# Patient Record
(demographics unavailable — no encounter records)

---

## 2025-01-19 NOTE — ASSESSMENT
[FreeTextEntry1] : Plan:  Order LAR Will need insurance preapproval Use Mail order ([facility]) if available  Contact Klaus Escamilla Daughter to call in 1 month for update

## 2025-01-19 NOTE — HISTORY OF PRESENT ILLNESS
[FreeTextEntry1] : Recently in hospital for angioectasias Accompanied by daughter  AMADO Ontiveros  Hospitalized twice yearly for blood transfusions since 2020. On oral iron supplementation Never IV iron. Plan is for LAR octreotide. Prior visit to Dr. Klaus Escamilla (hematology)   Octreotide 3 month supply Currently 100 mcg SQ BID  [facility]

## 2025-01-19 NOTE — ASSESSMENT
[FreeTextEntry1] : Plan:  Order LAR Will need insurance preapproval Use Mail order ([facility]) if available  Contact Klaus Escamilla Daughter to call in 1 month for update

## 2025-01-19 NOTE — CONSULT LETTER
[Dear  ___] : Dear  [unfilled], [Consult Letter:] : I had the pleasure of evaluating your patient, [unfilled]. [Please see my note below.] : Please see my note below. [Consult Closing:] : Thank you very much for allowing me to participate in the care of this patient.  If you have any questions, please do not hesitate to contact me. [Sincerely,] : Sincerely, [FreeTextEntry3] : Mehrdad Escamilla MD, MPH, CHRISTIE, UMAIRG Chief of Clinical Quality in Gastroenterology, [facility] Associate Chief of Gastroenterology, [facility] Interim Director of Endoscopy, [address], [address] [facility], [id number] 24 hours [phone]  [DrCarli  ___] : Dr. PINEDA

## 2025-01-19 NOTE — CONSULT LETTER
[Dear  ___] : Dear  [unfilled], [Consult Letter:] : I had the pleasure of evaluating your patient, [unfilled]. [Please see my note below.] : Please see my note below. [Consult Closing:] : Thank you very much for allowing me to participate in the care of this patient.  If you have any questions, please do not hesitate to contact me. [Sincerely,] : Sincerely, [FreeTextEntry3] : Mehrdad Escamilla MD, MPH, CHRISTIE, UMAIRG Chief of Clinical Quality in Gastroenterology, [facility] Associate Chief of Gastroenterology, [facility] Interim Director of Endoscopy, [address], [address] [facility], [address] 24 hours [phone]  [DrCarli  ___] : Dr. PINEDA

## 2025-01-19 NOTE — HISTORY OF PRESENT ILLNESS
[FreeTextEntry1] : Recently in hospital for angioectasias Accompanied by daughter  AMADO Ontiveros  Hospitalized twice yearly for blood transfusions since 2020. On oral iron supplementation Never IV iron. Plan is for LAR octreotide. Prior visit to Dr. Klaus Escamilla (hematology)   Octreotide 3 month supply Currently 100 mcg SQ BID  [facility]

## 2025-01-19 NOTE — PHYSICAL EXAM
[Alert] : alert [Sclera] : the sclera and conjunctiva were normal [Normal Affect] : the affect was normal [de-identified] : Soft, nondistended, nontender [de-identified] : No confusion

## 2025-01-19 NOTE — PHYSICAL EXAM
[Alert] : alert [Sclera] : the sclera and conjunctiva were normal [Normal Affect] : the affect was normal [de-identified] : Soft, nondistended, nontender [de-identified] : No confusion

## 2025-06-02 NOTE — CONSULT LETTER
[Dear  ___] : Dear  [unfilled], [Consult Letter:] : I had the pleasure of evaluating your patient, [unfilled]. [Please see my note below.] : Please see my note below. [Consult Closing:] : Thank you very much for allowing me to participate in the care of this patient.  If you have any questions, please do not hesitate to contact me. [Sincerely,] : Sincerely, [FreeTextEntry3] : Mehrdad Escamilla MD, MPH, CHRISTIE, UMAIRG Chief of Clinical Quality in Gastroenterology, [facility] Associate Chief of Gastroenterology, [facility] Interim Director of Endoscopy, [address], [address] [facility], [id number] 24 hours [phone]

## 2025-06-02 NOTE — PHYSICAL EXAM
[Alert] : alert [Sclera] : the sclera and conjunctiva were normal [Normal Affect] : the affect was normal [de-identified] : Soft, nondistended, nontender [de-identified] : Wheelchair [de-identified] : No confusion

## 2025-06-02 NOTE — CONSULT LETTER
[Dear  ___] : Dear  [unfilled], [Consult Letter:] : I had the pleasure of evaluating your patient, [unfilled]. [Please see my note below.] : Please see my note below. [Consult Closing:] : Thank you very much for allowing me to participate in the care of this patient.  If you have any questions, please do not hesitate to contact me. [Sincerely,] : Sincerely, [FreeTextEntry3] : Mehrdad Escamilla MD, MPH, CHRISTIE, UMAIRG Chief of Clinical Quality in Gastroenterology, [facility] Associate Chief of Gastroenterology, [facility] Interim Director of Endoscopy, [address], [address] [facility], [address] 24 hours [phone]

## 2025-06-02 NOTE — ASSESSMENT
[FreeTextEntry1] : Impression:  #1. GI bleed with melena (and small hematemesis on late Dec 2023 admission). Chronic asymptomatic normocytic anemia, with dark solid stools, and baseline Hb 9. Report of iron deficiency, on iron supplementation. Workup of anemia included EGD/colonoscopy in Feb 2019, with finding of colonic angioectasis which were treated with argon plasma coagulation. Hospitalized for GI bleed in early May 2020, had colonoscopy with treatment of colonic AVMs. Patient rehospitalized in early March 2023 for severe blood loss anemia requiring blood transfusions. Blood loss anemia improved with michael hemoglobin of 5, discharge 8-9, follow-up as outpatient 10. Outpatient video capsule endoscopy in spring 2023 demonstrated enteritis. Patient was planned for discussion of anterograde single balloon enteroscopy. Patient had acute coronary syndrome in fall 2023, started on dual antiplatelet agents for medical management for known unstentable disease, and hospitalized 3 times in December 2023 for recurrent GI bleed, CT angiogram demonstrated cecal bleeding, colonoscopy was performed with treatment for cecal AVMs with argon plasma coagulation. Upper endoscopy was performed in January 2024 for hematemesis, which demonstrated ulcerated mucosa in the stomach. Stable/improved hemoglobin levels on iron supplementation but then hospitalized early January with 4 days of melena and anemia, stable as inpatient. No endoscopic procedures done, octreotide given twice daily with plan for outpatient octreotide.  Last in hospital April 2025, push enteroscopy for GI bleed/blood loss anemia found peptic ulcer disease.  #2. Erythematous gastritis without H.pylori  #3. 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 Differential diagnosis includes gastric foveolar hyperplastic polyps with prolapse or hamartomatous polyps. Prior gastric endoscopic resection site biopsied in 2019, no evidence of recurrence  #4. COPD and obstructive sleep apnea, with abnormal CT scan demonstrating possible need for bronchoscopy, but this was not done by pulmonology.  #5. Jejunitis on prior video capsule endoscopy, ?ischemic or infectious, resolved.  Plan  #1.  Reduce pantoprazole to 40 mg daily  #2.  May continue ASA  #3.  Hematology followup for monitoring of Hb and iron supplementation  #4.  Telehealth 4 months.

## 2025-06-02 NOTE — HISTORY OF PRESENT ILLNESS
[FreeTextEntry1] : Pt hospitalized for GI bleed/melena/acute on chronic blood loss anemia in April 2025. Accompanied by aide. Conference call with daughter who translates Kiswahili at patient request.  Found by EGD/push enteroscopy to have clean based gastric ulcer. Was taking ASA 81 mg daily for carotid stenosis/peripheral vascular disease  No chest pain/dyspnea/dizziness On pantoprazole 40 mg PO BID, octreotide 30 mg SQ qmonth, and aspirin 81 mg q2 days, and daily iron pill.

## 2025-06-02 NOTE — PHYSICAL EXAM
[Alert] : alert [Sclera] : the sclera and conjunctiva were normal [Normal Affect] : the affect was normal [de-identified] : Soft, nondistended, nontender [de-identified] : Wheelchair [de-identified] : No confusion

## 2025-06-02 NOTE — HISTORY OF PRESENT ILLNESS
[FreeTextEntry1] : Pt hospitalized for GI bleed/melena/acute on chronic blood loss anemia in April 2025. Accompanied by aide. Conference call with daughter who translates Uzbek at patient request.  Found by EGD/push enteroscopy to have clean based gastric ulcer. Was taking ASA 81 mg daily for carotid stenosis/peripheral vascular disease  No chest pain/dyspnea/dizziness On pantoprazole 40 mg PO BID, octreotide 30 mg SQ qmonth, and aspirin 81 mg q2 days, and daily iron pill.